# Patient Record
Sex: FEMALE | Race: WHITE | URBAN - METROPOLITAN AREA
[De-identification: names, ages, dates, MRNs, and addresses within clinical notes are randomized per-mention and may not be internally consistent; named-entity substitution may affect disease eponyms.]

---

## 2019-06-05 ENCOUNTER — APPOINTMENT (OUTPATIENT)
Age: 64
Setting detail: DERMATOLOGY
End: 2019-06-06

## 2019-06-05 DIAGNOSIS — Z41.9 ENCOUNTER FOR PROCEDURE FOR PURPOSES OTHER THAN REMEDYING HEALTH STATE, UNSPECIFIED: ICD-10-CM

## 2019-06-05 PROCEDURE — OTHER COSMETIC CONSULTATION: BROWN SPOTS: OTHER

## 2019-06-05 PROCEDURE — 99201: CPT

## 2019-06-05 PROCEDURE — OTHER MEDICAL CONSULTATION: Q SWITCHED LASER: OTHER

## 2019-06-05 PROCEDURE — OTHER PRODUCT LINE (ANTI-AGING): OTHER

## 2019-06-05 ASSESSMENT — LOCATION DETAILED DESCRIPTION DERM
LOCATION DETAILED: LEFT PROXIMAL POSTERIOR UPPER ARM
LOCATION DETAILED: RIGHT ANKLE
LOCATION DETAILED: RIGHT DISTAL POSTERIOR UPPER ARM
LOCATION DETAILED: LEFT PROXIMAL PRETIBIAL REGION
LOCATION DETAILED: LEFT DISTAL POSTERIOR UPPER ARM
LOCATION DETAILED: LEFT PROXIMAL RADIAL DORSAL FOREARM
LOCATION DETAILED: RIGHT PROXIMAL DORSAL FOREARM
LOCATION DETAILED: RIGHT PROXIMAL PRETIBIAL REGION
LOCATION DETAILED: RIGHT LATERAL MALAR CHEEK
LOCATION DETAILED: LEFT DISTAL DORSAL FOREARM
LOCATION DETAILED: LEFT DISTAL PRETIBIAL REGION
LOCATION DETAILED: RIGHT SUPERIOR LATERAL BUCCAL CHEEK
LOCATION DETAILED: LEFT MID PREAURICULAR CHEEK
LOCATION DETAILED: RIGHT DISTAL PRETIBIAL REGION
LOCATION DETAILED: RIGHT PROXIMAL POSTERIOR UPPER ARM
LOCATION DETAILED: LEFT CENTRAL BUCCAL CHEEK
LOCATION DETAILED: RIGHT ELBOW
LOCATION DETAILED: RIGHT LATERAL BUCCAL CHEEK
LOCATION DETAILED: LEFT ULNAR DORSAL HAND
LOCATION DETAILED: RIGHT ULNAR DORSAL HAND
LOCATION DETAILED: LEFT INFERIOR LATERAL MALAR CHEEK

## 2019-06-05 ASSESSMENT — LOCATION ZONE DERM
LOCATION ZONE: FACE
LOCATION ZONE: ARM
LOCATION ZONE: LEG
LOCATION ZONE: HAND

## 2019-06-05 ASSESSMENT — LOCATION SIMPLE DESCRIPTION DERM
LOCATION SIMPLE: LEFT POSTERIOR UPPER ARM
LOCATION SIMPLE: LEFT CHEEK
LOCATION SIMPLE: RIGHT POSTERIOR UPPER ARM
LOCATION SIMPLE: RIGHT ANKLE
LOCATION SIMPLE: RIGHT FOREARM
LOCATION SIMPLE: LEFT HAND
LOCATION SIMPLE: RIGHT PRETIBIAL REGION
LOCATION SIMPLE: RIGHT ELBOW
LOCATION SIMPLE: LEFT FOREARM
LOCATION SIMPLE: LEFT PRETIBIAL REGION
LOCATION SIMPLE: RIGHT HAND
LOCATION SIMPLE: RIGHT CHEEK

## 2019-06-05 NOTE — PROCEDURE: PRODUCT LINE (ANTI-AGING)
Product 35 Units: 0
Name Of Product 36: Retinol Repair 0.05%
Product 39 Application Directions: Use as directed
Name Of Product 22: Elta MD 30 Sport
Product 19 Units: 1
Product 37 Price (In Dollars - Numeric Only, No Special Characters Or $): 14.00
Product 38 Application Directions: Apply to clean skin in the AM as directed
Product 13 Application Directions: Apply to clean face at night.
Product 2 Application Directions: Daytime~ after face cleanse before applying SPF
Product 1 Application Directions: Per directions, in the morning
Product 22 Application Directions: Apply 20 minutes before sun exposure
Name Of Product 26: Silagen Scar Gel with SPF
Product 54 Price (In Dollars - Numeric Only, No Special Characters Or $): 0.00
Name Of Product 4: Recovery Night Repair
Product 24 Price (In Dollars - Numeric Only, No Special Characters Or $): 54.00
Product 14 Price (In Dollars - Numeric Only, No Special Characters Or $): 187.00
Product 36 Price (In Dollars - Numeric Only, No Special Characters Or $): 114.00
Name Of Product 28: Elta MD Moisturizer
Product 11 Application Directions: Use as directed around the eyes at night.
Product 9 Application Directions: Use daily in the morning after Vitmain C serum
Name Of Product 35: Elta MD PM Therapy
Name Of Product 10: ZO Sensitive Skin Cleanser
Name Of Product 39: ZO Exfoliating Scrub
Name Of Product 27: Elta MD UV clear
Name Of Product 34: Nutrofol Supplement
Product 35 Price (In Dollars - Numeric Only, No Special Characters Or $): 36.00
Product 17 Application Directions: Cleanse with 1 pump morning and night
Product 8 Application Directions: Use morning and night
Name Of Product 16: ZO Renweal Cream
Name Of Product 37: Benzolyl Peroxide Wash
Name Of Product 21: Allastin
Product 1 Price (In Dollars - Numeric Only, No Special Characters Or $): 164.00
Name Of Product 24: Complexion Renewal Pads
Name Of Product 33: Glycogent
Product 19 Price (In Dollars - Numeric Only, No Special Characters Or $): 72.00
Product 31 Price (In Dollars - Numeric Only, No Special Characters Or $): 12.00
Product 4 Price (In Dollars - Numeric Only, No Special Characters Or $): 116.00
Name Of Product 38: Elta MD AM Therapy
Name Of Product 17: Elta MD Foaming Cleanser
Product 5 Application Directions: Morning and Night
Product 21 Price (In Dollars - Numeric Only, No Special Characters Or $): 200.00
Product 41 Price (In Dollars - Numeric Only, No Special Characters Or $): 53.00
Product 23 Application Directions: Use at night after cleansing face, layering with Tretinoin or Retinol
Product 40 Application Directions: Use as directed each morning
Name Of Product 2: ZO Vitamin C
Product 20 Price (In Dollars - Numeric Only, No Special Characters Or $): 32.00
Product 27 Price (In Dollars - Numeric Only, No Special Characters Or $): 33.00
Send Charges To Patient Encounter: Yes
Name Of Product 23: Pigment Control w/
Product 19 Application Directions: Apply 1 pump to clean skin daily as instructed
Product 40 Price (In Dollars - Numeric Only, No Special Characters Or $): 162.00
Product 16 Application Directions: Apply at night after cleaning face and treatment creams.
Product 37 Application Directions: Use in shower morning and after a workout.
Name Of Product 5: Damián MONTAÑO Foaming Cleanser
Name Of Product 40: ZO Growth Factor Serum
Name Of Product 3: ZO Hydra Firm
Product 9 Price (In Dollars - Numeric Only, No Special Characters Or $): $77.00
Name Of Product 41: Elta MD sport Spf 50
Product 25 Application Directions: Apply at night after cleansing, twice a week. Leave on for 20-25 minutes then rinse.
Product 5 Price (In Dollars - Numeric Only, No Special Characters Or $): 25.00
Product 23 Price (In Dollars - Numeric Only, No Special Characters Or $): 70.00
Name Of Product 30: Elta MD Enzyme Gel
Product 29 Application Directions: Apply as directed by provider
Product 33 Price (In Dollars - Numeric Only, No Special Characters Or $): 77.00
Product 2 Price (In Dollars - Numeric Only, No Special Characters Or $): $102.00
Name Of Product 14: Skin Medica TNS Serum
Product 26 Price (In Dollars - Numeric Only, No Special Characters Or $): 55.00
Product 22 Price (In Dollars - Numeric Only, No Special Characters Or $): 48.00
Product 4 Application Directions: Use as directed at night
Name Of Product 19: ZO Pigment Control
Product 34 Price (In Dollars - Numeric Only, No Special Characters Or $): 88.00
Product 39 Price (In Dollars - Numeric Only, No Special Characters Or $): 73.00
Product 21 Application Directions: Apply on clean face morning and night
Product 24 Application Directions: Apply after cleansing morning and night.
Product 3 Application Directions: Apply 1/2 a pea size around eyes morning and night after cleansing face.
Name Of Product 7: ZO Recovery cream
Name Of Product 1: ZO Daily Power Defense
Name Of Product 29: Elta MD Barrier cream
Name Of Product 11: ZO Intensive Eye Repair
Product 14 Application Directions: Apply at night to clean skin.
Name Of Product 25: ZO Sulfur Mask
Product 13 Price (In Dollars - Numeric Only, No Special Characters Or $): $167.00
Name Of Product 8: ZO Hydrating cleanser
Product 27 Application Directions: Apply in the morning as part of daily routine.
Product 32 Price (In Dollars - Numeric Only, No Special Characters Or $): 38.00
Name Of Product 6: ZO Wrinkle and Texture Repair
Name Of Product 15: Elta MD SPF 46 Daily UV
Name Of Product 9: ZO Smart Tone
Product 6 Price (In Dollars - Numeric Only, No Special Characters Or $): 158.00
Name Of Product 42: Elta MD spf 47 pure
Name Of Product 20: Elta MD UV Physical SPF 41
Product 10 Application Directions: Cleanse Morning and Night as directed
Product 18 Application Directions: Apply directly after cleansing morning and night
Detail Level: Zone
Name Of Product 13: ZO Radical Night Repair.
Product 30 Price (In Dollars - Numeric Only, No Special Characters Or $): 16.00
Product 25 Price (In Dollars - Numeric Only, No Special Characters Or $): 42.00
Product 6 Application Directions: Applying after cleansing in the evening
Product 26 Application Directions: Apply to healed scar twice daily.
Product 29 Price (In Dollars - Numeric Only, No Special Characters Or $): 50.00
Product 28 Price (In Dollars - Numeric Only, No Special Characters Or $): 13.00
Name Of Product 31: Elta MD lip balm
Product 15 Application Directions: Apply daily in morning, reapply throughout the day.
Product 3 Price (In Dollars - Numeric Only, No Special Characters Or $): $153.00
Product 36 Application Directions: Use at night, as directed
Product 12 Application Directions: Cleanse 2-3 times per week in the morning.
Name Of Product 32: Elta MD SPF 30 Lotion

## 2019-06-26 ENCOUNTER — APPOINTMENT (OUTPATIENT)
Age: 64
Setting detail: DERMATOLOGY
End: 2019-06-27

## 2019-06-26 DIAGNOSIS — Z41.9 ENCOUNTER FOR PROCEDURE FOR PURPOSES OTHER THAN REMEDYING HEALTH STATE, UNSPECIFIED: ICD-10-CM

## 2019-06-26 PROCEDURE — OTHER OTHER: OTHER

## 2019-06-26 PROCEDURE — OTHER MEDICAL CONSULTATION: Q SWITCHED LASER: OTHER

## 2019-06-26 PROCEDURE — OTHER COSMETIC CONSULTATION: CHEMICAL PEELS: OTHER

## 2019-06-26 ASSESSMENT — LOCATION ZONE DERM: LOCATION ZONE: LEG

## 2019-06-26 ASSESSMENT — LOCATION DETAILED DESCRIPTION DERM
LOCATION DETAILED: RIGHT PROXIMAL PRETIBIAL REGION
LOCATION DETAILED: LEFT PROXIMAL PRETIBIAL REGION

## 2019-06-26 ASSESSMENT — LOCATION SIMPLE DESCRIPTION DERM
LOCATION SIMPLE: RIGHT PRETIBIAL REGION
LOCATION SIMPLE: LEFT PRETIBIAL REGION

## 2019-06-26 NOTE — PROCEDURE: OTHER
Note Text (......Xxx Chief Complaint.): This diagnosis correlates with the
Other (Free Text): Patient is going to use HQ 4% for the next couple of weeks on both her arms, hands and Legs until she returns from [place], we discussed doing a text spot on her legs when she comes in for her laser treatment for arms and hands. We also discussed doing chemical peels as well. \\nFollow up 6 weeks. bb
Detail Level: Detailed

## 2019-07-29 ENCOUNTER — APPOINTMENT (OUTPATIENT)
Age: 64
Setting detail: DERMATOLOGY
End: 2019-08-05

## 2019-07-29 DIAGNOSIS — Z41.9 ENCOUNTER FOR PROCEDURE FOR PURPOSES OTHER THAN REMEDYING HEALTH STATE, UNSPECIFIED: ICD-10-CM

## 2019-07-29 PROCEDURE — OTHER OTHER (COSMETIC): OTHER

## 2019-07-29 PROCEDURE — OTHER Q-SWITCHED LASER: OTHER

## 2019-07-29 ASSESSMENT — LOCATION DETAILED DESCRIPTION DERM
LOCATION DETAILED: RIGHT CENTRAL MALAR CHEEK
LOCATION DETAILED: RIGHT ELBOW
LOCATION DETAILED: RIGHT RADIAL DORSAL HAND
LOCATION DETAILED: LEFT ULNAR DORSAL HAND
LOCATION DETAILED: RIGHT PROXIMAL DORSAL FOREARM
LOCATION DETAILED: LEFT CENTRAL MALAR CHEEK
LOCATION DETAILED: LEFT DISTAL DORSAL FOREARM
LOCATION DETAILED: LEFT PROXIMAL DORSAL FOREARM

## 2019-07-29 ASSESSMENT — LOCATION SIMPLE DESCRIPTION DERM
LOCATION SIMPLE: RIGHT ELBOW
LOCATION SIMPLE: LEFT HAND
LOCATION SIMPLE: RIGHT HAND
LOCATION SIMPLE: RIGHT FOREARM
LOCATION SIMPLE: RIGHT CHEEK
LOCATION SIMPLE: LEFT FOREARM
LOCATION SIMPLE: LEFT CHEEK

## 2019-07-29 ASSESSMENT — LOCATION ZONE DERM
LOCATION ZONE: FACE
LOCATION ZONE: HAND
LOCATION ZONE: ARM

## 2019-07-29 NOTE — PROCEDURE: Q-SWITCHED LASER
Spot Size In Mm: 4
Frequency In Hz: 1
Area In Cm^2: 0
Consent: Written consent obtained, risks reviewed including but not limited to crusting, scabbing, blistering, scarring, darker or lighter pigmentary change, systemic reactions, ulceration, incidental hair removal, bruising, and/or incomplete removal.
Spot Size In Mm: 2
Detail Level: Detailed
Anesthesia Type: 1% lidocaine with epinephrine
Endpoint: Immediate endpoint whitening and pinpoint bleeding. Vaseline and ice applied. Post care reviewed with patient.
Post-Care Instructions: I reviewed with the patient in detail post-care instructions. Patient should avoid sunlight and wear sun protection.
Fluence: 3.5

## 2019-07-29 NOTE — PROCEDURE: OTHER (COSMETIC)
Other (Free Text): Patient tolerated treatment. \\nWe reviewed post care, staying out of direct sunlight, sunscreen and wearing clothing to cover treated areas when outside for extended periods. \\nApplied smart tone spf 50 and given cold compress. \\nFollow up 1 month. bbw
Detail Level: Zone

## 2019-09-09 ENCOUNTER — APPOINTMENT (OUTPATIENT)
Age: 64
Setting detail: DERMATOLOGY
End: 2019-09-12

## 2019-09-09 DIAGNOSIS — Z41.9 ENCOUNTER FOR PROCEDURE FOR PURPOSES OTHER THAN REMEDYING HEALTH STATE, UNSPECIFIED: ICD-10-CM

## 2019-09-09 PROCEDURE — OTHER OTHER (COSMETIC): OTHER

## 2019-09-09 PROCEDURE — OTHER Q-SWITCHED LASER: OTHER

## 2019-09-09 ASSESSMENT — LOCATION SIMPLE DESCRIPTION DERM
LOCATION SIMPLE: LEFT SCALP
LOCATION SIMPLE: RIGHT FOREARM
LOCATION SIMPLE: RIGHT HAND
LOCATION SIMPLE: LEFT FOREARM
LOCATION SIMPLE: LEFT HAND

## 2019-09-09 ASSESSMENT — LOCATION DETAILED DESCRIPTION DERM
LOCATION DETAILED: RIGHT PROXIMAL DORSAL FOREARM
LOCATION DETAILED: LEFT CENTRAL FRONTAL SCALP
LOCATION DETAILED: LEFT PROXIMAL DORSAL FOREARM
LOCATION DETAILED: LEFT RADIAL DORSAL HAND
LOCATION DETAILED: RIGHT DORSAL MIDDLE METACARPOPHALANGEAL JOINT

## 2019-09-09 ASSESSMENT — LOCATION ZONE DERM
LOCATION ZONE: SCALP
LOCATION ZONE: HAND
LOCATION ZONE: ARM

## 2019-09-09 NOTE — PROCEDURE: OTHER (COSMETIC)
Other (Free Text): Touch up on a few spots from first treatment. \\nPatient will follow up for her legs in a month or two. \\nPatient tolerated treatment\\nWe reviewed post care, staying out of direct sunlight, sunscreen and wearing clothing to cover treated area when outside for extended periods. \\nApplied Smart Tone spf 50 and given a cold compress. \\nFollow up 1 month. bbw
Detail Level: Zone

## 2019-09-09 NOTE — PROCEDURE: Q-SWITCHED LASER
Frequency In Hz: 1
Endpoint: Immediate endpoint whitening and pinpoint bleeding. Vaseline and ice applied. Post care reviewed with patient.
Spot Size In Mm: 2
Fluence: 4.5
Area In Cm^2: 0
Spot Size In Mm: 4
Consent: Written consent obtained, risks reviewed including but not limited to crusting, scabbing, blistering, scarring, darker or lighter pigmentary change, systemic reactions, ulceration, incidental hair removal, bruising, and/or incomplete removal.
Detail Level: Detailed
Anesthesia Type: 1% lidocaine with epinephrine
Post-Care Instructions: I reviewed with the patient in detail post-care instructions. Patient should avoid sunlight and wear sun protection.

## 2019-10-22 ENCOUNTER — APPOINTMENT (OUTPATIENT)
Age: 64
Setting detail: DERMATOLOGY
End: 2019-10-28

## 2019-10-22 DIAGNOSIS — Z71.89 OTHER SPECIFIED COUNSELING: ICD-10-CM

## 2019-10-22 DIAGNOSIS — L57.8 OTHER SKIN CHANGES DUE TO CHRONIC EXPOSURE TO NONIONIZING RADIATION: ICD-10-CM

## 2019-10-22 DIAGNOSIS — L82.0 INFLAMED SEBORRHEIC KERATOSIS: ICD-10-CM

## 2019-10-22 DIAGNOSIS — L82.1 OTHER SEBORRHEIC KERATOSIS: ICD-10-CM

## 2019-10-22 DIAGNOSIS — L81.4 OTHER MELANIN HYPERPIGMENTATION: ICD-10-CM

## 2019-10-22 DIAGNOSIS — D22 MELANOCYTIC NEVI: ICD-10-CM

## 2019-10-22 DIAGNOSIS — L57.0 ACTINIC KERATOSIS: ICD-10-CM

## 2019-10-22 PROBLEM — D22.61 MELANOCYTIC NEVI OF RIGHT UPPER LIMB, INCLUDING SHOULDER: Status: ACTIVE | Noted: 2019-10-22

## 2019-10-22 PROBLEM — D22.5 MELANOCYTIC NEVI OF TRUNK: Status: ACTIVE | Noted: 2019-10-22

## 2019-10-22 PROBLEM — D22.71 MELANOCYTIC NEVI OF RIGHT LOWER LIMB, INCLUDING HIP: Status: ACTIVE | Noted: 2019-10-22

## 2019-10-22 PROBLEM — D22.72 MELANOCYTIC NEVI OF LEFT LOWER LIMB, INCLUDING HIP: Status: ACTIVE | Noted: 2019-10-22

## 2019-10-22 PROBLEM — D22.62 MELANOCYTIC NEVI OF LEFT UPPER LIMB, INCLUDING SHOULDER: Status: ACTIVE | Noted: 2019-10-22

## 2019-10-22 PROCEDURE — OTHER ORDER FOR PHOTODYNAMIC THERAPY: OTHER

## 2019-10-22 PROCEDURE — OTHER LIQUID NITROGEN: OTHER

## 2019-10-22 PROCEDURE — 17110 DESTRUCT B9 LESION 1-14: CPT

## 2019-10-22 PROCEDURE — 99214 OFFICE O/P EST MOD 30 MIN: CPT | Mod: 25

## 2019-10-22 PROCEDURE — OTHER TREATMENT REGIMEN: OTHER

## 2019-10-22 PROCEDURE — OTHER PHOTODYNAMIC THERAPY COUNSELING: OTHER

## 2019-10-22 PROCEDURE — OTHER COUNSELING: OTHER

## 2019-10-22 ASSESSMENT — LOCATION DETAILED DESCRIPTION DERM
LOCATION DETAILED: LEFT DISTAL POSTERIOR THIGH
LOCATION DETAILED: 3RD WEB SPACE LEFT HAND
LOCATION DETAILED: LEFT DISTAL POSTERIOR UPPER ARM
LOCATION DETAILED: LEFT PROXIMAL DORSAL FOREARM
LOCATION DETAILED: LEFT SUPERIOR LATERAL MIDBACK
LOCATION DETAILED: RIGHT PROXIMAL POSTERIOR UPPER ARM
LOCATION DETAILED: SUPERIOR THORACIC SPINE
LOCATION DETAILED: MID POSTERIOR NECK
LOCATION DETAILED: LEFT INFERIOR MEDIAL MALAR CHEEK
LOCATION DETAILED: RIGHT PROXIMAL POSTERIOR THIGH
LOCATION DETAILED: RIGHT POSTERIOR SHOULDER
LOCATION DETAILED: RIGHT PROXIMAL DORSAL FOREARM
LOCATION DETAILED: RIGHT DISTAL POSTERIOR THIGH
LOCATION DETAILED: LEFT POSTERIOR SHOULDER
LOCATION DETAILED: LEFT PROXIMAL POSTERIOR THIGH
LOCATION DETAILED: LEFT INFERIOR MEDIAL MIDBACK
LOCATION DETAILED: LEFT INFERIOR CENTRAL MALAR CHEEK
LOCATION DETAILED: LEFT PROXIMAL CALF
LOCATION DETAILED: RIGHT DISTAL POSTERIOR UPPER ARM
LOCATION DETAILED: LEFT CENTRAL MALAR CHEEK
LOCATION DETAILED: RIGHT DISTAL CALF

## 2019-10-22 ASSESSMENT — LOCATION SIMPLE DESCRIPTION DERM
LOCATION SIMPLE: LEFT CHEEK
LOCATION SIMPLE: LEFT POSTERIOR UPPER ARM
LOCATION SIMPLE: LEFT FOREARM
LOCATION SIMPLE: RIGHT FOREARM
LOCATION SIMPLE: RIGHT POSTERIOR THIGH
LOCATION SIMPLE: LEFT HAND
LOCATION SIMPLE: RIGHT CALF
LOCATION SIMPLE: LEFT LOWER BACK
LOCATION SIMPLE: UPPER BACK
LOCATION SIMPLE: RIGHT POSTERIOR UPPER ARM
LOCATION SIMPLE: RIGHT SHOULDER
LOCATION SIMPLE: LEFT CALF
LOCATION SIMPLE: POSTERIOR NECK
LOCATION SIMPLE: LEFT SHOULDER
LOCATION SIMPLE: LEFT POSTERIOR THIGH

## 2019-10-22 ASSESSMENT — LOCATION ZONE DERM
LOCATION ZONE: ARM
LOCATION ZONE: FACE
LOCATION ZONE: HAND
LOCATION ZONE: LEG
LOCATION ZONE: TRUNK
LOCATION ZONE: NECK

## 2019-10-22 NOTE — PROCEDURE: ORDER FOR PHOTODYNAMIC THERAPY
Arms Incubation Time: 3 hrs with occlusion
History Of Hsv (Optional): No
Location: Face
Pdt Type: Blue Light
Neck Incubation Time: 2 hours
Chest Incubation Time: 3 hours with occlusion
Detail Level: Zone
Consent: The procedure and risks were reviewed with the patient including but not limited to: burning, pigmentary changes, pain, blistering, scabbing, redness, and the possibility of needing numerous treatments. Strict photoprotection after the procedure was also discussed.
Photosensitizer: Levulan
Face And Scalp Incubation Time: 1.5 hours
Frequency Of Pdt: Single Treatment

## 2019-10-22 NOTE — PROCEDURE: LIQUID NITROGEN
Consent: The patient's consent was obtained including but not limited to risks of crusting, scabbing, blistering, scarring, darker or lighter pigmentary change, recurrence, incomplete removal and infection.
Medical Necessity Information: It is in your best interest to select a reason for this procedure from the list below. All of these items fulfill various CMS LCD requirements except the new and changing color options.
Render Note In Bullet Format When Appropriate: No
Post-Care Instructions: I reviewed with the patient in detail post-care instructions. Patient is to wear sunprotection, and avoid picking at any of the treated lesions. Pt may apply Vaseline to crusted or scabbing areas.
Render Post-Care Instructions In Note?: yes
Duration Of Freeze Thaw-Cycle (Seconds): 3
Medical Necessity Clause: This procedure was medically necessary because the lesions that were treated were:
Detail Level: Simple
Number Of Freeze-Thaw Cycles: 1 freeze-thaw cycle

## 2020-12-22 ENCOUNTER — APPOINTMENT (OUTPATIENT)
Age: 65
Setting detail: DERMATOLOGY
End: 2020-12-28

## 2020-12-22 DIAGNOSIS — Z41.9 ENCOUNTER FOR PROCEDURE FOR PURPOSES OTHER THAN REMEDYING HEALTH STATE, UNSPECIFIED: ICD-10-CM

## 2020-12-22 PROCEDURE — OTHER OTHER: OTHER

## 2020-12-22 ASSESSMENT — LOCATION DETAILED DESCRIPTION DERM: LOCATION DETAILED: LEFT INFERIOR CENTRAL MALAR CHEEK

## 2020-12-22 ASSESSMENT — LOCATION ZONE DERM: LOCATION ZONE: FACE

## 2020-12-22 ASSESSMENT — LOCATION SIMPLE DESCRIPTION DERM: LOCATION SIMPLE: LEFT CHEEK

## 2020-12-22 NOTE — PROCEDURE: OTHER
Detail Level: Detailed
Other (Free Text): Patient is here to treat her brow spots. Patient had fillers recently and will reschedule treatment as it is too close to treatment. bbw
Note Text (......Xxx Chief Complaint.): This diagnosis correlates with the

## 2021-11-12 ENCOUNTER — APPOINTMENT (OUTPATIENT)
Age: 66
Setting detail: DERMATOLOGY
End: 2021-11-14

## 2021-11-12 DIAGNOSIS — Z41.9 ENCOUNTER FOR PROCEDURE FOR PURPOSES OTHER THAN REMEDYING HEALTH STATE, UNSPECIFIED: ICD-10-CM

## 2021-11-12 PROCEDURE — OTHER Q-SWITCHED LASER: OTHER

## 2021-11-12 PROCEDURE — OTHER OTHER (COSMETIC): OTHER

## 2021-11-12 ASSESSMENT — LOCATION SIMPLE DESCRIPTION DERM
LOCATION SIMPLE: LEFT HAND
LOCATION SIMPLE: LEFT CHEEK
LOCATION SIMPLE: LEFT THIGH
LOCATION SIMPLE: RIGHT CHEEK
LOCATION SIMPLE: RIGHT HAND
LOCATION SIMPLE: RIGHT THIGH

## 2021-11-12 ASSESSMENT — LOCATION ZONE DERM
LOCATION ZONE: HAND
LOCATION ZONE: FACE
LOCATION ZONE: LEG

## 2021-11-12 ASSESSMENT — LOCATION DETAILED DESCRIPTION DERM
LOCATION DETAILED: RIGHT INFERIOR CENTRAL MALAR CHEEK
LOCATION DETAILED: LEFT ANTERIOR DISTAL THIGH
LOCATION DETAILED: LEFT INFERIOR LATERAL MALAR CHEEK
LOCATION DETAILED: LEFT SUPERIOR CENTRAL BUCCAL CHEEK
LOCATION DETAILED: RIGHT CENTRAL BUCCAL CHEEK
LOCATION DETAILED: LEFT RADIAL DORSAL HAND
LOCATION DETAILED: RIGHT ANTERIOR DISTAL THIGH
LOCATION DETAILED: RIGHT ULNAR DORSAL HAND

## 2021-11-12 NOTE — PROCEDURE: Q-SWITCHED LASER
Price (Use Numbers Only, No Special Characters Or $): 200.00
Frequency In Hz: 3
Frequency In Hz: 1
External Cooling Fan Speed: 0
Anesthesia Type: 1% lidocaine with epinephrine
Spot Size In Mm: 2
Endpoint: Immediate endpoint whitening and pinpoint bleeding. Vaseline and ice applied. Post care reviewed with patient.
Detail Level: Detailed
Consent: Written consent obtained, risks reviewed including but not limited to crusting, scabbing, blistering, scarring, darker or lighter pigmentary change, systemic reactions, ulceration, incidental hair removal, bruising, and/or incomplete removal.
Fluence: 3.5
Spot Size In Mm: 4
Post-Care Instructions: I reviewed with the patient in detail post-care instructions. Patient should avoid sunlight and wear sun protection.

## 2021-12-27 ENCOUNTER — APPOINTMENT (OUTPATIENT)
Age: 66
Setting detail: DERMATOLOGY
End: 2021-12-27

## 2021-12-27 DIAGNOSIS — Z41.9 ENCOUNTER FOR PROCEDURE FOR PURPOSES OTHER THAN REMEDYING HEALTH STATE, UNSPECIFIED: ICD-10-CM

## 2021-12-27 PROCEDURE — OTHER Q-SWITCHED LASER: OTHER

## 2021-12-27 PROCEDURE — OTHER OTHER (COSMETIC): OTHER

## 2021-12-27 ASSESSMENT — LOCATION DETAILED DESCRIPTION DERM
LOCATION DETAILED: LEFT DISTAL PRETIBIAL REGION
LOCATION DETAILED: LEFT INFERIOR LATERAL MALAR CHEEK
LOCATION DETAILED: LEFT KNEE
LOCATION DETAILED: RIGHT PROXIMAL PRETIBIAL REGION
LOCATION DETAILED: RIGHT DISTAL PRETIBIAL REGION
LOCATION DETAILED: LEFT PROXIMAL PRETIBIAL REGION
LOCATION DETAILED: LEFT LATERAL BUCCAL CHEEK
LOCATION DETAILED: LEFT SUPERIOR LATERAL BUCCAL CHEEK

## 2021-12-27 ASSESSMENT — LOCATION SIMPLE DESCRIPTION DERM
LOCATION SIMPLE: LEFT CHEEK
LOCATION SIMPLE: RIGHT PRETIBIAL REGION
LOCATION SIMPLE: LEFT PRETIBIAL REGION
LOCATION SIMPLE: LEFT KNEE

## 2021-12-27 ASSESSMENT — LOCATION ZONE DERM
LOCATION ZONE: LEG
LOCATION ZONE: FACE

## 2021-12-27 NOTE — PROCEDURE: OTHER (COSMETIC)
Detail Level: Zone
Other (Free Text): Patient tolerated treatment.\\nWe reviewed post care, staying out of direct sunlight, sunscreen and wearing clothing to cover treated area when outside for extended periods.\\nGiven cold compress \\nFollow up 1 month. bbw

## 2021-12-27 NOTE — PROCEDURE: Q-SWITCHED LASER
Treatment Number: 1
Spot Size In Mm: 2
Area In Cm^2: 0
Price (Use Numbers Only, No Special Characters Or $): 200.00
Detail Level: Detailed
Endpoint: Immediate endpoint whitening and pinpoint bleeding. Vaseline and ice applied. Post care reviewed with patient.
Consent: Written consent obtained, risks reviewed including but not limited to crusting, scabbing, blistering, scarring, darker or lighter pigmentary change, systemic reactions, ulceration, incidental hair removal, bruising, and/or incomplete removal.
Post-Care Instructions: I reviewed with the patient in detail post-care instructions. Patient should avoid sunlight and wear sun protection.
Fluence: 4.5
Spot Size In Mm: 4
Anesthesia Type: 1% lidocaine with epinephrine